# Patient Record
Sex: FEMALE
[De-identification: names, ages, dates, MRNs, and addresses within clinical notes are randomized per-mention and may not be internally consistent; named-entity substitution may affect disease eponyms.]

---

## 2022-04-06 PROBLEM — Z00.00 ENCOUNTER FOR PREVENTIVE HEALTH EXAMINATION: Status: ACTIVE | Noted: 2022-04-06

## 2022-05-05 ENCOUNTER — APPOINTMENT (OUTPATIENT)
Dept: NEUROSURGERY | Facility: CLINIC | Age: 32
End: 2022-05-05
Payer: SELF-PAY

## 2022-05-05 DIAGNOSIS — H93.A9 PULSATILE TINNITUS, UNSPECIFIED EAR: ICD-10-CM

## 2022-05-05 PROCEDURE — EDU01: CPT

## 2022-05-05 RX ORDER — ESCITALOPRAM OXALATE 5 MG/1
TABLET, FILM COATED ORAL
Refills: 0 | Status: ACTIVE | COMMUNITY

## 2022-05-10 NOTE — HISTORY OF PRESENT ILLNESS
[de-identified] : I conducted a remote educational consult with ANIL ANGUIANO on 05/05/2022. I explained that I am only able to provide an educational consult and not render treatment as I am not licensed in the state in which [place] is located. A written informed consent for the educational consult was obtained and is included in the EHR. ANIL ANGUIANO is informed that there would be a $250 cost associated with the conduct of the remote educational consult. \par \par Ms. ANGUIANO is a pleasant 31 year old female who presents today with a chief complaint of RIGHT ear pulsatile tinnitus.  It first started a couple of years go.  It is described as a constant, whooshing sound that is in sync with her pulse.  Pressing on the side of her neck does not change the sound.  Exercise and yawning makes it worse.  \par \par She has been seen by ENT in the past and had a normal hearing test as per patient.  \par \par She denies any headaches, blurry vision or diplopia.\par \par She states that she could live with the sound but she is very anxious that something serious is going on.  
no

## 2022-05-10 NOTE — REASON FOR VISIT
[Home] : at home, [unfilled] , at the time of the visit. [Medical Office: ([facility])___] : at the medical office located in  [Verbal consent obtained from patient] : the patient, [unfilled] [New Patient Visit] : a new patient visit [FreeTextEntry1] : Pulsatile Tinnitus

## 2022-05-10 NOTE — ASSESSMENT
[FreeTextEntry1] : IMPRESSION\par RIGHT Pulsatile Tinnitus\par Improves with ipsilateral neck pressure\par No Headaches or Vision Complaints\par \par We discussed possible causes of pulsatile tinnitus including:\par ENT causes such as semicircular canal dehiscence, tumor, ototoxicity\par Cardiac causes such as aortic stenosis, valve disease, HTN, other causes of heart murmur\par Anemia\par Thyroid disease\par Cerebrovascular causes such as arteriovenous malformation (AVM), dural arteriovenous fistula (dAVF), venous sinus stenosis, venous aneurysm, large trans-mastoid emissary vein, carotid stenosis, jugular bulb diverticulum \par \par MRA Head 7/2021 reveals no dural AVF or AVM\par MRV Head 7/2021 reveals no venous sinus stenosis\par \par I reassured her that I do not see any dangerous cerebrovascular cause of pulsatile tinnitus on her imaging.  She does report some TMJ/jaw clenching which can also be a cause of her pulsatile tinnitus.\par \par \par PLAN\par See TMJ Specialist\par Follow Up with Me As Needed